# Patient Record
Sex: MALE | Race: WHITE | NOT HISPANIC OR LATINO | Employment: FULL TIME | URBAN - METROPOLITAN AREA
[De-identification: names, ages, dates, MRNs, and addresses within clinical notes are randomized per-mention and may not be internally consistent; named-entity substitution may affect disease eponyms.]

---

## 2024-01-17 ENCOUNTER — APPOINTMENT (EMERGENCY)
Dept: RADIOLOGY | Facility: HOSPITAL | Age: 44
End: 2024-01-17
Payer: COMMERCIAL

## 2024-01-17 ENCOUNTER — HOSPITAL ENCOUNTER (EMERGENCY)
Facility: HOSPITAL | Age: 44
Discharge: HOME | End: 2024-01-18
Attending: EMERGENCY MEDICINE
Payer: COMMERCIAL

## 2024-01-17 VITALS
BODY MASS INDEX: 20.2 KG/M2 | DIASTOLIC BLOOD PRESSURE: 81 MMHG | WEIGHT: 157.41 LBS | SYSTOLIC BLOOD PRESSURE: 133 MMHG | HEART RATE: 62 BPM | RESPIRATION RATE: 14 BRPM | TEMPERATURE: 97.5 F | HEIGHT: 74 IN | OXYGEN SATURATION: 98 %

## 2024-01-17 DIAGNOSIS — R07.9 CHEST PAIN, UNSPECIFIED: Primary | ICD-10-CM

## 2024-01-17 LAB
ALBUMIN SERPL BCP-MCNC: 4.5 G/DL (ref 3.5–5)
ALP SERPL-CCNC: 74 U/L (ref 34–104)
ALT SERPL W P-5'-P-CCNC: 11 U/L (ref 7–52)
ANION GAP SERPL CALCULATED.3IONS-SCNC: 7 MMOL/L
AST SERPL W P-5'-P-CCNC: 17 U/L (ref 13–39)
BASOPHILS # BLD AUTO: 0.03 THOUSANDS/ÂΜL (ref 0–0.1)
BASOPHILS NFR BLD AUTO: 1 % (ref 0–1)
BILIRUB SERPL-MCNC: 1.57 MG/DL (ref 0.2–1)
BUN SERPL-MCNC: 21 MG/DL (ref 5–25)
CALCIUM SERPL-MCNC: 9.1 MG/DL (ref 8.4–10.2)
CARDIAC TROPONIN I PNL SERPL HS: 2 NG/L
CHLORIDE SERPL-SCNC: 102 MMOL/L (ref 96–108)
CO2 SERPL-SCNC: 29 MMOL/L (ref 21–32)
CREAT SERPL-MCNC: 0.93 MG/DL (ref 0.6–1.3)
EOSINOPHIL # BLD AUTO: 0.12 THOUSAND/ÂΜL (ref 0–0.61)
EOSINOPHIL NFR BLD AUTO: 2 % (ref 0–6)
ERYTHROCYTE [DISTWIDTH] IN BLOOD BY AUTOMATED COUNT: 11.8 % (ref 11.6–15.1)
GFR SERPL CREATININE-BSD FRML MDRD: 98 ML/MIN/1.73SQ M
GLUCOSE SERPL-MCNC: 110 MG/DL (ref 65–140)
HCT VFR BLD AUTO: 47 % (ref 36.5–49.3)
HGB BLD-MCNC: 15.8 G/DL (ref 12–17)
IMM GRANULOCYTES # BLD AUTO: 0.01 THOUSAND/UL (ref 0–0.2)
IMM GRANULOCYTES NFR BLD AUTO: 0 % (ref 0–2)
LYMPHOCYTES # BLD AUTO: 2.46 THOUSANDS/ÂΜL (ref 0.6–4.47)
LYMPHOCYTES NFR BLD AUTO: 38 % (ref 14–44)
MAGNESIUM SERPL-MCNC: 2.1 MG/DL (ref 1.9–2.7)
MCH RBC QN AUTO: 28.3 PG (ref 26.8–34.3)
MCHC RBC AUTO-ENTMCNC: 33.6 G/DL (ref 31.4–37.4)
MCV RBC AUTO: 84 FL (ref 82–98)
MONOCYTES # BLD AUTO: 0.42 THOUSAND/ÂΜL (ref 0.17–1.22)
MONOCYTES NFR BLD AUTO: 6 % (ref 4–12)
NEUTROPHILS # BLD AUTO: 3.48 THOUSANDS/ÂΜL (ref 1.85–7.62)
NEUTS SEG NFR BLD AUTO: 53 % (ref 43–75)
NRBC BLD AUTO-RTO: 0 /100 WBCS
PLATELET # BLD AUTO: 192 THOUSANDS/UL (ref 149–390)
PMV BLD AUTO: 9.2 FL (ref 8.9–12.7)
POTASSIUM SERPL-SCNC: 4.2 MMOL/L (ref 3.5–5.3)
PROT SERPL-MCNC: 7.2 G/DL (ref 6.4–8.4)
RBC # BLD AUTO: 5.59 MILLION/UL (ref 3.88–5.62)
SODIUM SERPL-SCNC: 138 MMOL/L (ref 135–147)
WBC # BLD AUTO: 6.52 THOUSAND/UL (ref 4.31–10.16)

## 2024-01-17 PROCEDURE — 84484 ASSAY OF TROPONIN QUANT: CPT | Performed by: EMERGENCY MEDICINE

## 2024-01-17 PROCEDURE — 73030 X-RAY EXAM OF SHOULDER: CPT

## 2024-01-17 PROCEDURE — 96374 THER/PROPH/DIAG INJ IV PUSH: CPT

## 2024-01-17 PROCEDURE — 85025 COMPLETE CBC W/AUTO DIFF WBC: CPT | Performed by: EMERGENCY MEDICINE

## 2024-01-17 PROCEDURE — 93005 ELECTROCARDIOGRAM TRACING: CPT

## 2024-01-17 PROCEDURE — 99284 EMERGENCY DEPT VISIT MOD MDM: CPT | Performed by: EMERGENCY MEDICINE

## 2024-01-17 PROCEDURE — 99285 EMERGENCY DEPT VISIT HI MDM: CPT

## 2024-01-17 PROCEDURE — 96361 HYDRATE IV INFUSION ADD-ON: CPT

## 2024-01-17 PROCEDURE — 80053 COMPREHEN METABOLIC PANEL: CPT | Performed by: EMERGENCY MEDICINE

## 2024-01-17 PROCEDURE — 96375 TX/PRO/DX INJ NEW DRUG ADDON: CPT

## 2024-01-17 PROCEDURE — 71045 X-RAY EXAM CHEST 1 VIEW: CPT

## 2024-01-17 PROCEDURE — 36415 COLL VENOUS BLD VENIPUNCTURE: CPT | Performed by: EMERGENCY MEDICINE

## 2024-01-17 PROCEDURE — 83735 ASSAY OF MAGNESIUM: CPT | Performed by: EMERGENCY MEDICINE

## 2024-01-17 RX ORDER — KETOROLAC TROMETHAMINE 30 MG/ML
15 INJECTION, SOLUTION INTRAMUSCULAR; INTRAVENOUS ONCE
Status: COMPLETED | OUTPATIENT
Start: 2024-01-17 | End: 2024-01-17

## 2024-01-17 RX ORDER — FAMOTIDINE 10 MG/ML
20 INJECTION, SOLUTION INTRAVENOUS ONCE
Status: COMPLETED | OUTPATIENT
Start: 2024-01-17 | End: 2024-01-17

## 2024-01-17 RX ADMIN — FAMOTIDINE 20 MG: 10 INJECTION, SOLUTION INTRAVENOUS at 22:05

## 2024-01-17 RX ADMIN — SODIUM CHLORIDE 1000 ML: 0.9 INJECTION, SOLUTION INTRAVENOUS at 22:02

## 2024-01-17 RX ADMIN — KETOROLAC TROMETHAMINE 15 MG: 30 INJECTION, SOLUTION INTRAMUSCULAR at 22:03

## 2024-01-18 LAB
2HR DELTA HS TROPONIN: 1 NG/L
CARDIAC TROPONIN I PNL SERPL HS: 3 NG/L

## 2024-01-18 NOTE — ED PROVIDER NOTES
History  Chief Complaint   Patient presents with    Chest Pain     C/o L arm and chest pain that started about 2pm at work,     43-year-old male with past history of desmoid tumor of abdomen status postresection, otherwise healthy, presents to the ED for evaluation of left-sided anterior chest wall discomfort since yesterday afternoon that started to radiate to left arm today while patient was at work.  Patient states that he did a lot of snow shoveling yesterday.  Patient thought his discomfort was from the shoveling of snow.  Patient went to work today.  Patient works at a warehouse.  Patient does a lot of heavy lifting.  Patient noted pain started to radiate to his left shoulder and left arm.  Subsequently patient came to the ED for further evaluation.  Patient denies any previous hypertension or cardiovascular events.  Patient denies any cold or cough symptoms.  Patient denies any headache, weakness, dizziness, or any focal neurodeficits.      History provided by:  Patient      None       Past Medical History:   Diagnosis Date    Desmoid tumor of abdomen        Past Surgical History:   Procedure Laterality Date    CT NEEDLE BIOPSY RETROPERITONEUM  02/07/2022    TUMOR REMOVAL      desmoid abdominal tumor       History reviewed. No pertinent family history.  I have reviewed and agree with the history as documented.    E-Cigarette/Vaping    E-Cigarette Use Never User      E-Cigarette/Vaping Substances     Social History     Tobacco Use    Smoking status: Never    Smokeless tobacco: Never   Vaping Use    Vaping status: Never Used   Substance Use Topics    Alcohol use: Yes     Comment: rare    Drug use: Never       Review of Systems   Constitutional:  Negative for chills and fever.   HENT:  Negative for ear pain and sore throat.    Eyes:  Negative for pain and visual disturbance.   Respiratory:  Negative for cough and shortness of breath.    Cardiovascular:  Positive for chest pain. Negative for palpitations.    Gastrointestinal:  Negative for abdominal pain and vomiting.   Genitourinary:  Negative for dysuria and hematuria.   Musculoskeletal:  Negative for arthralgias and back pain.   Skin:  Negative for color change and rash.   Neurological:  Negative for seizures and syncope.   All other systems reviewed and are negative.      Physical Exam  Physical Exam  Vitals and nursing note reviewed.   Constitutional:       General: He is not in acute distress.     Appearance: He is well-developed.   HENT:      Head: Normocephalic and atraumatic.   Eyes:      Conjunctiva/sclera: Conjunctivae normal.   Cardiovascular:      Rate and Rhythm: Normal rate and regular rhythm.      Heart sounds: No murmur heard.  Pulmonary:      Effort: Pulmonary effort is normal. No respiratory distress.      Breath sounds: Normal breath sounds.      Comments: Lungs are clear to auscultation bilateral.  No tenderness to palpation noted to anterior chest wall bilaterally.  Patient states that he feels some discomfort internally.  Abdominal:      Palpations: Abdomen is soft.      Tenderness: There is no abdominal tenderness.   Musculoskeletal:         General: No swelling.      Cervical back: Neck supple.      Comments: Mild tenderness to palpation noted over the left AC joint.  Range of motion of shoulder is otherwise intact.  No obvious bony deformity, or skin changes noted on examination of left shoulder.   Skin:     General: Skin is warm and dry.      Capillary Refill: Capillary refill takes less than 2 seconds.   Neurological:      Mental Status: He is alert.   Psychiatric:         Mood and Affect: Mood normal.         Vital Signs  ED Triage Vitals [01/17/24 2116]   Temperature Pulse Respirations Blood Pressure SpO2   98.3 °F (36.8 °C) 72 20 144/89 100 %      Temp Source Heart Rate Source Patient Position - Orthostatic VS BP Location FiO2 (%)   Tympanic Monitor Sitting Right arm --      Pain Score       7           Vitals:    01/17/24 2116 01/17/24  2130 01/17/24 2308 01/17/24 2315   BP: 144/89 169/88 133/81 133/81   Pulse: 72 72 59 62   Patient Position - Orthostatic VS: Sitting Lying Lying Lying         Visual Acuity      ED Medications  Medications   sodium chloride 0.9 % bolus 1,000 mL (0 mL Intravenous Stopped 1/17/24 2306)   ketorolac (TORADOL) injection 15 mg (15 mg Intravenous Given 1/17/24 2203)   Famotidine (PF) (PEPCID) injection 20 mg (20 mg Intravenous Given 1/17/24 2205)       Diagnostic Studies  Results Reviewed       Procedure Component Value Units Date/Time    HS Troponin I 2hr [[id number]]  (Normal) Collected: 01/17/24 2346    Lab Status: Final result Specimen: Blood from Arm, Left Updated: 01/18/24 0011     hs TnI 2hr 3 ng/L      Delta 2hr hsTnI 1 ng/L     HS Troponin I 4hr [[id number]]     Lab Status: No result Specimen: Blood     HS Troponin 0hr (reflex protocol) [[id number]]  (Normal) Collected: 01/17/24 2137    Lab Status: Final result Specimen: Blood from Arm, Left Updated: 01/17/24 2249     hs TnI 0hr 2 ng/L     Comprehensive metabolic panel [[id number]]  (Abnormal) Collected: 01/17/24 2137    Lab Status: Final result Specimen: Blood from Arm, Left Updated: 01/17/24 2220     Sodium 138 mmol/L      Potassium 4.2 mmol/L      Chloride 102 mmol/L      CO2 29 mmol/L      ANION GAP 7 mmol/L      BUN 21 mg/dL      Creatinine 0.93 mg/dL      Glucose 110 mg/dL      Calcium 9.1 mg/dL      AST 17 U/L      ALT 11 U/L      Alkaline Phosphatase 74 U/L      Total Protein 7.2 g/dL      Albumin 4.5 g/dL      Total Bilirubin 1.57 mg/dL      eGFR 98 ml/min/1.73sq m     Narrative:      National Kid[employer] Foundation guidelines for Chronic Kidney Disease (CKD):     Stage 1 with normal or high GFR (GFR > 90 mL/min/1.73 square meters)    Stage 2 Mild CKD (GFR = 60-89 mL/min/1.73 square meters)    Stage 3A Moderate CKD (GFR = 45-59 mL/min/1.73 square meters)    Stage 3B Moderate CKD (GFR = 30-44 mL/min/1.73 square meters)    Stage 4 Severe CKD (GFR = 15-29  mL/min/1.73 square meters)    Stage 5 End Stage CKD (GFR <15 mL/min/1.73 square meters)  Note: GFR calculation is accurate only with a steady state creatinine    Magnesium [[id number]]  (Normal) Collected: 01/17/24 2137    Lab Status: Final result Specimen: Blood from Arm, Left Updated: 01/17/24 2220     Magnesium 2.1 mg/dL     CBC and differential [[id number]] Collected: 01/17/24 2137    Lab Status: Final result Specimen: Blood from Arm, Left Updated: 01/17/24 2144     WBC 6.52 Thousand/uL      RBC 5.59 Million/uL      Hemoglobin 15.8 g/dL      Hematocrit 47.0 %      MCV 84 fL      MCH 28.3 pg      MCHC 33.6 g/dL      RDW 11.8 %      MPV 9.2 fL      Platelets 192 Thousands/uL      nRBC 0 /100 WBCs      Neutrophils Relative 53 %      Immat GRANS % 0 %      Lymphocytes Relative 38 %      Monocytes Relative 6 %      Eosinophils Relative 2 %      Basophils Relative 1 %      Neutrophils Absolute 3.48 Thousands/µL      Immature Grans Absolute 0.01 Thousand/uL      Lymphocytes Absolute 2.46 Thousands/µL      Monocytes Absolute 0.42 Thousand/µL      Eosinophils Absolute 0.12 Thousand/µL      Basophils Absolute 0.03 Thousands/µL                    XR shoulder 2+ views LEFT   ED Interpretation by Chelsea Major DO (01/17 2305)   No acute bony abnormalities noted.  Formal radiology reading pending.      XR chest 1 view portable   ED Interpretation by Chelsea Major DO (01/17 2305)   No acute abnormalities noted.  Formal radiology reading pending.                 Procedures  ECG 12 Lead Documentation Only    Date/Time: 1/17/2024 9:18 PM    Performed by: Chelsea Major DO  Authorized by: Chelsea Major DO    Indications / Diagnosis:  Chest pain  ECG reviewed by me, the ED Provider: yes    Patient location:  ED  Previous ECG:     Previous ECG:  Unavailable    Comparison to cardiac monitor: Yes    Interpretation:     Interpretation: abnormal    Comments:      Sinus rhythm, rate 71, normal axis, normal intervals, no acute  ST/T wave abnormalities noted, minimal voltage criteria noted for LVH, otherwise nonspecific EKG, no previous EKG available for comparison.           ED Course  ED Course as of 01/18/24 0104   Thu Jan 18, 2024   0033 Patient reexamined at bedside.  Patient is pain-free after IV fluids and Toradol.  Patient will be discharged home.             HEART Risk Score      Flowsheet Row Most Recent Value   Heart Score Risk Calculator    History 0 Filed at: 01/18/2024 0036   ECG 1 Filed at: 01/18/2024 0036   Age 0 Filed at: 01/18/2024 0036   Risk Factors 0 Filed at: 01/18/2024 0036   Troponin 0 Filed at: 01/18/2024 0036   HEART Score 1 Filed at: 01/18/2024 0036                          SBIRT 20yo+      Flowsheet Row Most Recent Value   Initial Alcohol Screen: US AUDIT-C     1. How often do you have a drink containing alcohol? 1 Filed at: 01/17/2024 2119   2. How many drinks containing alcohol do you have on a typical day you are drinking?  0 Filed at: 01/17/2024 2119   3a. Male UNDER 65: How often do you have five or more drinks on one occasion? 0 Filed at: 01/17/2024 2119   Audit-C Score 1 Filed at: 01/17/2024 2119   DECLAN: How many times in the past year have you...    Used an illegal drug or used a prescription medication for non-medical reasons? Never Filed at: 01/17/2024 2119                      Medical Decision Making  Obtain blood work, EKG, chest x-ray, left shoulder x-ray   Give IV fluids, IV Toradol, Pepcid and continue to monitor patient for any worsening symptoms.    Patient had 2 consecutive unremarkable troponin.  However his heart score is low.  X-rays did not show any acute abnormalities.  Since pain completely resolved after medications given in the ED.  At this time patient's pain is most likely musculoskeletal in origin.  Patient discharged home with over-the-counter medication as needed for pain and to follow-up to PCP as well as cardiology for any worsening symptoms.  Close return instructions given to  return to the ER for any worsening symptoms.  Patient agrees with discharge plan.  Patient well appearing at time of discharge.    Please Note: Fluency Direct voice recognition software may have been used in the creation of this document. Wrong words or sound a like substitutions may have occurred due to the inherent limitations of the voice software.         Amount and/or Complexity of Data Reviewed  Labs: ordered.  Radiology: ordered and independent interpretation performed.    Risk  Prescription drug management.             Disposition  Final diagnoses:   Chest pain, unspecified     Time reflects when diagnosis was documented in both MDM as applicable and the Disposition within this note       Time User Action Codes Description Comment    1/18/2024 12:35 AM Chelsea Major Add [R07.9] Chest pain, unspecified           ED Disposition       ED Disposition   Discharge    Condition   Stable    Date/Time   Thu Jan 18, 2024 0035    Comment   Emanuel Farhat discharge to home/self care.                   Follow-up Information       Follow up With Specialties Details Why Contact Info Additional Information    [facility] Schedule an appointment as soon as possible for a visit  Schedule an appointment for an outpatient stress test. [address] [facility] [address]  [facility] [address]  [phone] [facility], [address] [place] [address] [address], [place], [place], [address]   [phone]            Patient's Medications    No medications on file           PDMP Review       None            ED Provider  Electronically Signed by             Chelsea Major DO  01/18/24 0104

## 2024-01-19 LAB
ATRIAL RATE: 71 BPM
P AXIS: 69 DEGREES
PR INTERVAL: 120 MS
QRS AXIS: 62 DEGREES
QRSD INTERVAL: 98 MS
QT INTERVAL: 404 MS
QTC INTERVAL: 439 MS
T WAVE AXIS: 45 DEGREES
VENTRICULAR RATE: 71 BPM